# Patient Record
Sex: FEMALE
[De-identification: names, ages, dates, MRNs, and addresses within clinical notes are randomized per-mention and may not be internally consistent; named-entity substitution may affect disease eponyms.]

---

## 2017-10-25 ENCOUNTER — HOSPITAL ENCOUNTER (EMERGENCY)
Dept: HOSPITAL 14 - H.ER | Age: 27
Discharge: HOME | End: 2017-10-25
Payer: COMMERCIAL

## 2017-10-25 VITALS
SYSTOLIC BLOOD PRESSURE: 126 MMHG | TEMPERATURE: 98.7 F | OXYGEN SATURATION: 100 % | DIASTOLIC BLOOD PRESSURE: 64 MMHG | HEART RATE: 90 BPM | RESPIRATION RATE: 16 BRPM

## 2017-10-25 DIAGNOSIS — M53.3: ICD-10-CM

## 2017-10-25 DIAGNOSIS — Z88.0: ICD-10-CM

## 2017-10-25 DIAGNOSIS — M54.5: Primary | ICD-10-CM

## 2017-10-25 NOTE — ED PDOC
HPI: Back


Time Seen by Provider: 10/25/17 21:35


Chief Complaint (Nursing): Back Pain


Chief Complaint (Provider): Tailbone pain


History Per: Patient


History/Exam Limitations: no limitations


Onset/Duration Of Symptoms: Days (3)


Current Symptoms Are (Timing): Still Present


Additional Complaint(s): 





Patient is a 28 y/o female with no significant past medical history presenting 

to the emergency department for tailbone pain following a fall two days ago. 

Reports that slipped at work and landed on her buttocks. The pain then began 

yesterday and now radiates to her hips. Notes that she vomited once yesterday 

due to the pain. Reports minimal relief of pain from Aleve. Denies nausea, fever

, diarrhea, abdominal pain, blood in stool or urine, or other complaints. 





PCP: none provided.





Past Medical History


Reviewed: Historical Data


Vital Signs: 


 Last Vital Signs











Temp  98.7 F   10/25/17 21:14


 


Pulse  90   10/25/17 21:14


 


Resp  16   10/25/17 21:14


 


BP  126/64   10/25/17 21:14


 


Pulse Ox  100   10/25/17 21:14














- Medical History


PMH: No Chronic Diseases





- Family History


Family History: States: No Known Family Hx





- Home Medications


Home Medications: 


 Ambulatory Orders











 Medication  Instructions  Recorded


 


Cyclobenzaprine [Cyclobenzaprine 10 mg PO BID #14 tab 10/25/17





HCl]  


 


Ibuprofen [Motrin] 400 mg PO Q6 #30 tab 10/25/17














- Allergies


Allergies/Adverse Reactions: 


 Allergies











Allergy/AdvReac Type Severity Reaction Status Date / Time


 


Penicillins Allergy  RASH Verified 10/25/17 21:14














Review of Systems


ROS Statement: Except As Marked, All Systems Reviewed And Found Negative


Gastrointestinal: Positive for: Vomiting (1 episode).  Negative for: Nausea, 

Abdominal Pain, Diarrhea, Hematochezia


Genitourinary Female: Negative for: Hematuria


Musculoskeletal: Positive for: Other (Tailbone pain that radiates to hips)





Physical Exam





- Reviewed


Nursing Documentation Reviewed: Yes





- Physical Exam


Appears: Positive for: Well, Non-toxic, No Acute Distress


Head Exam: Positive for: ATRAUMATIC, NORMAL INSPECTION, NORMOCEPHALIC


Skin: Positive for: Normal Color, Warm, Dry


Eye Exam: Positive for: Normal appearance


Neck: Positive for: Normal


Cardiovascular/Chest: Positive for: Regular Rate, Rhythm


Respiratory: Negative for: Accessory Muscle Use, Respiratory Distress


Gastrointestinal/Abdominal: Positive for: Normal Exam, Bowel Sounds, Soft.  

Negative for: Tenderness


Back: Positive for: Vertebral Tenderness (from L5 to S1, mild)


Extremity: Positive for: Normal ROM (hips).  Negative for: Tenderness (ischial 

tuberosity or interochanteric pain)


Neurologic/Psych: Positive for: Alert, Oriented (x3)





- ECG


O2 Sat by Pulse Oximetry: 100 (RA)


Pulse Ox Interpretation: Normal





Medical Decision Making


Medical Decision Making: 





Time: 21:39





Initial impression: Tailbone pain





Initial plan:


 ED Urine Pregnancy


 X-ray sacrum and coccyx-negative. f/u wtih pmd flexril and motrin for pain





--------------------------------------------------------------------------------

-----------------~


Scribe Attestation:


Documented by Radha Salgado, acting as a scribe for MAGGIE Dent.





Provider Scribe Attestation:


All medical record entries made by the Scribe were at my direction and 

personally dictated by me. I have reviewed the chart and agree that the record 

accurately reflects my personal performance of the history, physical exam, 

medical decision making, and the department course for this patient. I have 

also personally directed, reviewed, and agree with the discharge instructions 

and disposition.





Disposition





- Clinical Impression


Clinical Impression: 


 Sacral pain








- Patient ED Disposition


Is Patient to be Admitted: No


Counseled Patient/Family Regarding: Need For Followup





- Disposition


Disposition: Routine/Home


Disposition Time: 22:55


Condition: STABLE


Prescriptions: 


Cyclobenzaprine [Cyclobenzaprine HCl] 10 mg PO BID #14 tab


Ibuprofen [Motrin] 400 mg PO Q6 #30 tab


Instructions:  Back Pain (ED)


Forms:  [facility] (English)

## 2017-10-26 NOTE — RAD
PROCEDURE:  Radiographs of the Sacrum and Coccyx



HISTORY:

direct injury



COMPARISON:

None available.



TECHNIQUE:

Frontal and lateral views of the sacrum and coccyx



FINDINGS:



BONES:

Sacrum and coccyx unremarkable. No fracture or focal lesion.



SACROILIAC JOINTS:

Unremarkable.



OTHER FINDINGS:

None.



IMPRESSION:

Unremarkable radiographs of the sacrum and coccyx.

## 2018-07-22 ENCOUNTER — HOSPITAL ENCOUNTER (EMERGENCY)
Dept: HOSPITAL 14 - H.ER | Age: 28
Discharge: HOME | End: 2018-07-22
Payer: COMMERCIAL

## 2018-07-22 VITALS — OXYGEN SATURATION: 99 %

## 2018-07-22 VITALS
HEART RATE: 81 BPM | SYSTOLIC BLOOD PRESSURE: 125 MMHG | DIASTOLIC BLOOD PRESSURE: 75 MMHG | RESPIRATION RATE: 15 BRPM | TEMPERATURE: 98.1 F

## 2018-07-22 DIAGNOSIS — Z88.0: ICD-10-CM

## 2018-07-22 DIAGNOSIS — N30.90: Primary | ICD-10-CM

## 2018-07-22 LAB
ALBUMIN SERPL-MCNC: 3.9 G/DL (ref 3.5–5)
ALBUMIN/GLOB SERPL: 1.3 {RATIO} (ref 1–2.1)
ALT SERPL-CCNC: 27 U/L (ref 9–52)
AST SERPL-CCNC: 22 U/L (ref 14–36)
BACTERIA #/AREA URNS HPF: (no result) /[HPF]
BASOPHILS # BLD AUTO: 0.1 K/UL (ref 0–0.2)
BASOPHILS NFR BLD: 1 % (ref 0–2)
BILIRUB UR-MCNC: NEGATIVE MG/DL
BUN SERPL-MCNC: 15 MG/DL (ref 7–17)
CALCIUM SERPL-MCNC: 8.7 MG/DL (ref 8.4–10.2)
COLOR UR: YELLOW
EOSINOPHIL # BLD AUTO: 0.5 K/UL (ref 0–0.7)
EOSINOPHIL NFR BLD: 7.2 % (ref 0–4)
ERYTHROCYTE [DISTWIDTH] IN BLOOD BY AUTOMATED COUNT: 14.3 % (ref 11.5–14.5)
GFR NON-AFRICAN AMERICAN: > 60
GLUCOSE UR STRIP-MCNC: (no result) MG/DL
HGB BLD-MCNC: 12 G/DL (ref 12–16)
LEUKOCYTE ESTERASE UR-ACNC: (no result) LEU/UL
LYMPHOCYTES # BLD AUTO: 2.7 K/UL (ref 1–4.3)
LYMPHOCYTES NFR BLD AUTO: 41.4 % (ref 20–40)
MCH RBC QN AUTO: 26.9 PG (ref 27–31)
MCHC RBC AUTO-ENTMCNC: 33.3 G/DL (ref 33–37)
MCV RBC AUTO: 80.9 FL (ref 81–99)
MONOCYTES # BLD: 0.5 K/UL (ref 0–0.8)
MONOCYTES NFR BLD: 7.6 % (ref 0–10)
NEUTROPHILS # BLD: 2.8 K/UL (ref 1.8–7)
NEUTROPHILS NFR BLD AUTO: 42.8 % (ref 50–75)
NRBC BLD AUTO-RTO: 0.1 % (ref 0–0)
PH UR STRIP: 6 [PH] (ref 5–8)
PLATELET # BLD: 237 K/UL (ref 130–400)
PMV BLD AUTO: 8.2 FL (ref 7.2–11.7)
PROT UR STRIP-MCNC: NEGATIVE MG/DL
RBC # BLD AUTO: 4.46 MIL/UL (ref 3.8–5.2)
RBC # UR STRIP: NEGATIVE /UL
SP GR UR STRIP: 1.01 (ref 1–1.03)
SQUAMOUS EPITHIAL: 3 /HPF (ref 0–5)
URINE CLARITY: (no result)
UROBILINOGEN UR-MCNC: (no result) MG/DL (ref 0.2–1)
WBC # BLD AUTO: 6.5 K/UL (ref 4.8–10.8)

## 2018-07-22 PROCEDURE — 99284 EMERGENCY DEPT VISIT MOD MDM: CPT

## 2018-07-22 PROCEDURE — 81003 URINALYSIS AUTO W/O SCOPE: CPT

## 2018-07-22 PROCEDURE — 72070 X-RAY EXAM THORAC SPINE 2VWS: CPT

## 2018-07-22 PROCEDURE — 87040 BLOOD CULTURE FOR BACTERIA: CPT

## 2018-07-22 PROCEDURE — 81025 URINE PREGNANCY TEST: CPT

## 2018-07-22 PROCEDURE — 96372 THER/PROPH/DIAG INJ SC/IM: CPT

## 2018-07-22 PROCEDURE — 74176 CT ABD & PELVIS W/O CONTRAST: CPT

## 2018-07-22 PROCEDURE — 72100 X-RAY EXAM L-S SPINE 2/3 VWS: CPT

## 2018-07-22 PROCEDURE — 80053 COMPREHEN METABOLIC PANEL: CPT

## 2018-07-22 PROCEDURE — 85025 COMPLETE CBC W/AUTO DIFF WBC: CPT

## 2018-07-22 PROCEDURE — 87086 URINE CULTURE/COLONY COUNT: CPT

## 2018-07-22 NOTE — ED PDOC
HPI: Back


Time Seen by Provider: 18 17:53


Chief Complaint (Nursing): Back Pain


Chief Complaint (Provider): Back Pain


History Per: Patient


History/Exam Limitations: no limitations


Onset/Duration Of Symptoms: Days (x4-5)


Current Symptoms Are (Timing): Still Present


Additional Complaint(s): 


27 y/o female with no significant PMHx presenting for evaluation of back pain x4

-5 days. Patient states for the past 4-5 days shes had atraumatic lower and 

mid back pain. She reports she does a lot of heavy lifting at work, but denies 

any injury. She states the pain is non-radiating and has no alleviating or 

exacerbating factors. She states she had an MRI 5-6 years ago which showed she 

had a herniated disc in her lower back. She denies any hematuria, dysuria, 

incontinence, abdominal pain, nausea, vomiting, fever, or chest pain. 





PMD: Non-[facility] Provider








Past Medical History


Reviewed: Historical Data, Nursing Documentation, Vital Signs


Vital Signs: 


 Last Vital Signs











Temp  98.1 F   18 21:04


 


Pulse  81   18 21:04


 


Resp  15   18 21:04


 


BP  125/75   18 21:04


 


Pulse Ox  99   18 21:04














- Medical History


PMH: No Chronic Diseases





- Surgical History


Surgical History: No Surg Hx





- Family History


Family History: States: Unknown Family Hx





- Home Medications


Home Medications: 


 Ambulatory Orders











 Medication  Instructions  Recorded


 


Cyclobenzaprine [Cyclobenzaprine 10 mg PO BID #14 tab 10/25/17





HCl]  


 


Ibuprofen [Motrin] 400 mg PO Q6 #30 tab 10/25/17


 


Ciprofloxacin [Cipro] 500 mg PO BID #14 tab 18


 


Naproxen [Naprosyn] 500 mg PO BID PRN #30 tab 18


 


Phenazopyridine [Pyridium] 100 mg PO BID #6 tab 18














- Allergies


Allergies/Adverse Reactions: 


 Allergies











Allergy/AdvReac Type Severity Reaction Status Date / Time


 


Penicillins Allergy  RASH Verified 10/25/17 21:14














Review of Systems


ROS Statement: Except As Marked, All Systems Reviewed And Found Negative


Constitutional: Negative for: Fever


Cardiovascular: Negative for: Chest Pain


Gastrointestinal: Negative for: Nausea, Vomiting, Abdominal Pain


Genitourinary Female: Negative for: Dysuria, Incontinence, Hematuria


Musculoskeletal: Positive for: Back Pain





Physical Exam





- Reviewed


Nursing Documentation Reviewed: Yes


Vital Signs Reviewed: Yes





- Physical Exam


Appears: Positive for: Non-toxic, No Acute Distress


Cardiovascular/Chest: Positive for: Regular Rate, Rhythm.  Negative for: Murmur


Respiratory: Positive for: Normal Breath Sounds.  Negative for: Respiratory 

Distress


Gastrointestinal/Abdominal: Positive for: Normal Exam, Soft.  Negative for: 

Tenderness


Back: Positive for: Normal Inspection.  Negative for: L CVA Tenderness, R CVA 

Tenderness, Vertebral Tenderness


Neurologic/Psych: Positive for: Alert, Oriented (x3)





- Laboratory Results


Result Diagrams: 


 18 20:07





 18 20:07





- ECG


O2 Sat by Pulse Oximetry: 99 (RA)


Pulse Ox Interpretation: Normal





Medical Decision Making


Medical Decision Makin:04


Plan:


-Urine pregnancy


-Flexeril 10mg PO


-Lidocaine 5%


-Toradol 30mg IM


-X-Ray dorsal spine


-X-Ray lumbar spine


-Urinalysis


-Reevaluation





UA shows positive nitrates due to symptoms of back pain. CT abdomen and IV 

Cipro ordered.


CT abd/pelvis w/o contrast: 1. Circumferential bladder wall thickening is 

suggestive of cystitis.


2. Moderate amount of gas and stool in the colon.


Pt. informed of results. Pt. in no distress. No CVA tenderness b/l. 








--------------------------------------------------------------------------------

-----------------


Scribe Attestation:


Documented by Jay Dawson, acting as a scribe for Abraham Graves PA-C.


   


Provider Scribe Attestation:


All medical record entries made by the scribe were at my direction and 

personally dictated by me. I have reviewed the chart and agree that the record 

accurately reflects my personal performance of the history, physical exam, 

medical decision making, and the department course for this patient. I have 

also personally directed, reviewed, and agree with the discharge instructions 

and disposition.











Disposition





- Clinical Impression


Clinical Impression: 


 UTI (urinary tract infection)








- Patient ED Disposition


Is Patient to be Admitted: No


Counseled Patient/Family Regarding: Studies Performed, Diagnosis, Need For 

Followup, Rx Given





- Disposition


Referrals: 


[facility] [Outside]


[facility] [Outside]


Disposition: Routine/Home


Disposition Time: 21:04


Condition: IMPROVED


Additional Instructions: 





NIKI APODACA, thank you for letting us take care of you today. Your 

provider was Herminia Akhtar MD and you were treated for BACK PAIN. The 

emergency medical care you received today was directed at your acute symptoms. 

If you were prescribed any medication, please fill it and take as directed. It 

may take several days for your symptoms to resolve. Return to the Emergency 

Department if your symptoms worsen, do not improve, or if you have any other 

problems.





Please contact your doctor or call one of the physicians/clinics you have been 

referred to that are listed on the Patient Visit Information form that is 

included in your discharge packet. Bring any paperwork you were given at 

discharge with you along with any medications you are taking to your follow up 

visit. Our treatment cannot replace ongoing medical care by a primary care 

provider outside of the emergency department.





Thank you for allowing the [facility] [employer] team to be part of your care today.








If you had an X-Ray or CT scan: A Radiologist will review the ED reading if any 

change in treatment is needed we will contact you.***





If you had a blood, urine, or wound culture: It will take several days for the 

results, if any change in treatment is needed we will contact you.***





If you had an STI test: It will take 48 hours for the results. Please call 

after 1 week if you have not heard back.***


Prescriptions: 


Ciprofloxacin [Cipro] 500 mg PO BID #14 tab


Naproxen [Naprosyn] 500 mg PO BID PRN #30 tab


 PRN Reason: Pain


Phenazopyridine [Pyridium] 100 mg PO BID #6 tab


Instructions:  Urinary Tract Infection, Adult (DC)


Forms:  [facility] (English), [facility] ED School/Work Excuse


Print Language: ENGLISH

## 2018-07-23 NOTE — RAD
Date of service: 



07/22/2018



HISTORY:

pain







COMPARISON:

No prior.



FINDINGS:



BONES:

Alignment maintained. No fracture.



DISC SPACES:

Normal.



SOFT TISSUES:

Normal.



OTHER FINDINGS:

None.



IMPRESSION:

Unremarkable radiographs of the thoracic spine.

## 2018-07-23 NOTE — CT
Date of service: 



07/22/2018



PROCEDURE:  CT Abdomen and Pelvis without intravenous contrast



HISTORY:

UTI b/l upper back pain



COMPARISON:

None.



TECHNIQUE:

Helical CT of the abdomen and pelvis was performed without oral or 

intravenous contrast as per referring physician request. 



Contrast dose: None



Radiation dose:



Total exam DLP = 211.10 mGy-cm.



This CT exam was performed using one or more of the following dose 

reduction techniques: Automated exposure control, adjustment of the 

mA and/or kV according to patient size, and/or use of iterative 

reconstruction technique.



FINDINGS:



LOWER THORAX:

A tiny subpleural calcified granuloma left lower lobe with lung bases 

otherwise unremarkable bilaterally. 



LIVER:

Unremarkable. No gross lesion or ductal dilatation.  



GALLBLADDER AND BILE DUCTS:

Unremarkable. 



PANCREAS:

Unremarkable. No gross lesion or ductal dilatation.



SPLEEN:

Unremarkable. 



ADRENALS:

Unremarkable. No mass. 



KIDNEYS AND URETERS:

There is no radiodense urolithiasis, obstructive uropathy or 

perinephric reaction bilaterally. Kidneys appear homogeneous in 

overall density throughout. Urinary bladder appears unremarkable as 

well. 



VASCULATURE:

Unremarkable. No aortic aneurysm. 



BOWEL:

The stomach is distended with retained food. No obstruction. No gross 

mural thickening. 



APPENDIX:

Unremarkable. Normal appendix. 



PERITONEUM:

Unremarkable. No free fluid. No free air. 



LYMPH NODES:

Unremarkable. No enlarged lymph nodes. 



BLADDER:

The urinary bladder is not fully distended and evaluation of the 

urinary bladder wall is incomplete.  Thickening is not excluded. 



REPRODUCTIVE:

Tampon identified within the endometrial cavity. 



BONES:

No acute fracture. 



OTHER FINDINGS:

None.



IMPRESSION:

No obstructive uropathy, radiodense urolithiasis or perinephric 

reaction.  Urinary bladder is not fully distended and is incomplete 

in evaluation.  Cystitis is difficult to completely exclude though 

this is not favored.



Further evaluation of the remaining abdominal pelvic versus limited 

due lack of oral and intravenous contrast administration. No definite 

acute findings appreciable grossly.



Concordant preliminary report from [facility], 07/22/2018.

## 2018-07-23 NOTE — RAD
Date of service: 



07/22/2018



PROCEDURE:  Radiographs of the Lumbar Spine.



HISTORY:

pain







COMPARISON:

No prior.



FINDINGS:



BONES:

Normal alignment. No listhesis. No fracture.



DISC SPACES:

Unremarkable.



OTHER FINDINGS:

None.



IMPRESSION:

Unremarkable radiographs of the lumbar spine.

## 2018-07-31 ENCOUNTER — HOSPITAL ENCOUNTER (OUTPATIENT)
Dept: HOSPITAL 14 - H.ER | Age: 28
Setting detail: OBSERVATION
LOS: 1 days | Discharge: HOME | End: 2018-08-01
Attending: INTERNAL MEDICINE | Admitting: INTERNAL MEDICINE
Payer: MEDICAID

## 2018-07-31 VITALS — RESPIRATION RATE: 20 BRPM

## 2018-07-31 VITALS — BODY MASS INDEX: 21.4 KG/M2

## 2018-07-31 DIAGNOSIS — N39.0: Primary | ICD-10-CM

## 2018-07-31 DIAGNOSIS — Z88.0: ICD-10-CM

## 2018-07-31 LAB
ALBUMIN SERPL-MCNC: 4.4 G/DL (ref 3.5–5)
ALBUMIN/GLOB SERPL: 1.2 {RATIO} (ref 1–2.1)
ALT SERPL-CCNC: 25 U/L (ref 9–52)
AST SERPL-CCNC: 33 U/L (ref 14–36)
BACTERIA #/AREA URNS HPF: (no result) /[HPF]
BASOPHILS # BLD AUTO: 0 K/UL (ref 0–0.2)
BASOPHILS NFR BLD: 0.3 % (ref 0–2)
BILIRUB UR-MCNC: NEGATIVE MG/DL
BUN SERPL-MCNC: 15 MG/DL (ref 7–17)
CALCIUM SERPL-MCNC: 8.9 MG/DL (ref 8.4–10.2)
COLOR UR: YELLOW
EOSINOPHIL # BLD AUTO: 0.1 K/UL (ref 0–0.7)
EOSINOPHIL NFR BLD: 1.1 % (ref 0–4)
ERYTHROCYTE [DISTWIDTH] IN BLOOD BY AUTOMATED COUNT: 14.1 % (ref 11.5–14.5)
GFR NON-AFRICAN AMERICAN: > 60
GLUCOSE UR STRIP-MCNC: (no result) MG/DL
HGB BLD-MCNC: 13 G/DL (ref 12–16)
LEUKOCYTE ESTERASE UR-ACNC: (no result) LEU/UL
LYMPHOCYTES # BLD AUTO: 1.3 K/UL (ref 1–4.3)
LYMPHOCYTES NFR BLD AUTO: 16.3 % (ref 20–40)
MCH RBC QN AUTO: 27.1 PG (ref 27–31)
MCHC RBC AUTO-ENTMCNC: 33.2 G/DL (ref 33–37)
MCV RBC AUTO: 81.7 FL (ref 81–99)
MONOCYTES # BLD: 0.3 K/UL (ref 0–0.8)
MONOCYTES NFR BLD: 3.9 % (ref 0–10)
NEUTROPHILS # BLD: 6.2 K/UL (ref 1.8–7)
NEUTROPHILS NFR BLD AUTO: 78.4 % (ref 50–75)
NRBC BLD AUTO-RTO: 0.1 % (ref 0–0)
PH UR STRIP: 7 [PH] (ref 5–8)
PLATELET # BLD: 238 K/UL (ref 130–400)
PMV BLD AUTO: 8.7 FL (ref 7.2–11.7)
PROT UR STRIP-MCNC: NEGATIVE MG/DL
RBC # BLD AUTO: 4.78 MIL/UL (ref 3.8–5.2)
RBC # UR STRIP: NEGATIVE /UL
SP GR UR STRIP: 1.02 (ref 1–1.03)
SQUAMOUS EPITHIAL: 1 /HPF (ref 0–5)
URINE AMORPHOUS SEDIMENT: (no result) /UL
URINE CLARITY: (no result)
UROBILINOGEN UR-MCNC: (no result) MG/DL (ref 0.2–1)
WBC # BLD AUTO: 7.9 K/UL (ref 4.8–10.8)

## 2018-07-31 PROCEDURE — 87040 BLOOD CULTURE FOR BACTERIA: CPT

## 2018-07-31 PROCEDURE — 81003 URINALYSIS AUTO W/O SCOPE: CPT

## 2018-07-31 PROCEDURE — 76830 TRANSVAGINAL US NON-OB: CPT

## 2018-07-31 PROCEDURE — 99285 EMERGENCY DEPT VISIT HI MDM: CPT

## 2018-07-31 PROCEDURE — 80053 COMPREHEN METABOLIC PANEL: CPT

## 2018-07-31 PROCEDURE — 81025 URINE PREGNANCY TEST: CPT

## 2018-07-31 PROCEDURE — 85025 COMPLETE CBC W/AUTO DIFF WBC: CPT

## 2018-07-31 PROCEDURE — 87086 URINE CULTURE/COLONY COUNT: CPT

## 2018-07-31 NOTE — US
Date of service: 



07/31/2018



HISTORY:

Pelvic pain 



COMPARISON:

None available.



TECHNIQUE:

Transvaginal pelvic ultrasound was performed.



FINDINGS:



UTERUS:

Measures 6.4 x 3.2 x 4.3 cm. Anteverted, normal in size and 

appearance. No fibroid or other mass lesion seen.



ENDOMETRIUM:

Measures 3.0 mm in diameter. The central endometrial echo complex is 

normal in appearance. 



CERVIX:

No cervical abnormality identified.



RIGHT OVARY:

Measures 2.9 x 1.5 x 2.6 cm. No solid mass. Normal flow. 



LEFT OVARY:

Measures 3.0 x 1.8 x 2.3 cm. No solid mass. Normal flow. There is a 

1.5 x 0.8 x 1.3 cm dominant follicle/ cyst.



FREE FLUID:

There is small amount of free fluid in the pelvis, likely physiologic.



OTHER FINDINGS:

None. 



IMPRESSION:

Unremarkable pelvic ultrasound.

## 2018-07-31 NOTE — ED PDOC
HPI: Female  Pain


Time Seen by Provider: 07/31/18 09:13


Chief Complaint (Nursing): Female Genitourinary


Chief Complaint (Provider): pelvic pain


History Per: Patient


History/Exam Limitations: no limitations


Onset/Duration Of Symptoms: Days


Current Symptoms Are (Timing): Still Present


Additional Complaint(s): 





Pt. came to the ER 1.5 weeks approx for left back pain and right lower pelvic 

pain.  Had Ct and urine eval.  Dx with uti and given cipro.  She was called 

several days later and antibiotics changed to macrobid due to sensitivities.  

Pt. here as she still has left back pain and right pelvic pain.  No dysuria, 

hematuria.  No freq of urination.  No chest pain, dyspnea, weakness, dizziness, 

fever.  





Past Medical History


Reviewed: Nursing Documentation, Vital Signs


Vital Signs: 





 Last Vital Signs











Temp  97.8 F   07/31/18 08:47


 


Pulse  69   07/31/18 08:47


 


Resp  16   07/31/18 08:47


 


BP  121/72   07/31/18 08:47


 


Pulse Ox  100   07/31/18 08:47














- Medical History


PMH: No Chronic Diseases





- Surgical History


Surgical History: No Surg Hx





- Family History


Family History: States: Unknown Family Hx





- Home Medications


Home Medications: 


 Ambulatory Orders











 Medication  Instructions  Recorded


 


Cyclobenzaprine [Cyclobenzaprine 10 mg PO BID #14 tab 10/25/17





HCl]  


 


Ibuprofen [Motrin] 400 mg PO Q6 #30 tab 10/25/17


 


Ciprofloxacin [Cipro] 500 mg PO BID #14 tab 07/22/18


 


Naproxen [Naprosyn] 500 mg PO BID PRN #30 tab 07/22/18


 


Phenazopyridine [Pyridium] 100 mg PO BID #6 tab 07/22/18


 


Nitrofurantoin Macrocrystals 100 mg PO BID #14 cap 07/25/18





[Macrobid]  














- Allergies


Allergies/Adverse Reactions: 


 Allergies











Allergy/AdvReac Type Severity Reaction Status Date / Time


 


Penicillins Allergy  RASH Verified 10/25/17 21:14














Review of Systems


ROS Statement: Except As Marked, All Systems Reviewed And Found Negative


Gastrointestinal: Positive for: Abdominal Pain


Genitourinary Female: Positive for: Pelvic Pain


Musculoskeletal: Positive for: Back Pain





Physical Exam





- Reviewed


Nursing Documentation Reviewed: Yes


Vital Signs Reviewed: Yes





- Physical Exam


Appears: Positive for: Non-toxic, No Acute Distress


Head Exam: Positive for: ATRAUMATIC, NORMAL INSPECTION, NORMOCEPHALIC


Skin: Positive for: Normal Color, Warm, DRY


Eye Exam: Positive for: EOMI, Normal appearance, PERRL


ENT: Positive for: Normal ENT Inspection


Neck: Positive for: Normal, Painless ROM


Cardiovascular/Chest: Positive for: Regular Rate, Rhythm


Respiratory: Positive for: CNT, Normal Breath Sounds


Gastrointestinal/Abdominal: Positive for: Soft, Tenderness (R lower pelvic)


Back: Positive for: Normal Inspection, L CVA Tenderness (mild).  Negative for: 

R CVA Tenderness


Extremity: Positive for: Normal ROM.  Negative for: Tenderness, Pedal Edema


Neurologic/Psych: Positive for: Alert, Oriented





- Laboratory Results


Result Diagrams: 


 07/31/18 10:45





 07/31/18 10:45


Interpretation Of Abn Labs: no acute





- ECG


O2 Sat by Pulse Oximetry: 100





- CT Scan/US


  ** US


Other Rad Studies (CT/US): Read By Radiologist


Other Rad Interpretation: no acute





- Progress


ED Course And Treament: 





940:  CT from previous shows no appendix issues or kidney stones.  Pt. with 

continued issues.  Will check blood work and urine.  Pt. sensitive to macrobid 

and is on it. 





1304:  Still uncomfortable and vomiting.   Sensitive to cefepime.  Will give 

that and Dr. Garcia aware and will admit.





Disposition





- Clinical Impression


Clinical Impression: 


 UTI (urinary tract infection), Intractable abdominal pain, Vomiting








- Patient ED Disposition


Is Patient to be Admitted: Yes


Counseled Patient/Family Regarding: Studies Performed, Diagnosis





- Disposition


Disposition Time: 12:10


Condition: FAIR





- Pt Status Changed To:


Hospital Disposition Of: Observation





- POA


Present On Arrival: None

## 2018-08-01 VITALS — DIASTOLIC BLOOD PRESSURE: 68 MMHG | HEART RATE: 71 BPM | SYSTOLIC BLOOD PRESSURE: 114 MMHG | TEMPERATURE: 98.7 F

## 2018-08-01 VITALS — OXYGEN SATURATION: 100 %

## 2018-08-01 NOTE — HP
Copied To:  Armond Garcia MD

Attending MD:  Armond Garcia MD



SUBJECTIVE:  Ms. Zhanna Chaudhary is a 28-year-old female who was admitted via

the emergency room because of intractable abdominal pain, nausea for the

past 1 week, worse on the day of admission.  She had been seen in the

emergency room in the past, treated for urinary tract infection with Cipro.

She was called back several days later due to poor sensitivities from the

Cipro and was placed on Macrobid.  The patient had indicated that she had

developed left back pain and right pelvic pain with dysuria and hematuria

and was back in the hospital where she was admitted with intractable

abdominal pain.



PAST MEDICAL HISTORY:  She has an unremarkable past medical history.



FAMILY HISTORY:  Noncontributory.



SOCIAL HISTORY:  She does not drink or smoke.



REVIEW OF SYSTEMS:  Essentially unremarkable.



PHYSICAL EXAMINATION:

GENERAL:  The patient is alert, oriented, appears to be in some distress

because of abdominal pain.

VITAL SIGNS:  Remarkable for blood pressure of 121/72, pulse of 69,

respiratory rate of 60.  He is febrile.  O2 sat 100% on room air.

SKIN:  Shows fair turgor.  Pupils are equal and reactive to light and

accommodation.

MOUTH:  Shows fair hygiene.

NECK:  JVP flat.

LUNGS:  Clear.

HEART:  Regular.  No murmurs or gallop.

ABDOMEN:  Soft with some left upper quadrant tenderness and also suprapubic

tenderness.

GENITAL AND RECTAL:  Deferred.

CENTRAL NERVOUS SYSTEM:  Grossly intact.



LABORATORY DATA:  WBC 7.9, hemoglobin 13, platelet count 238,000.  Sodium

142, potassium 3.7, BUN 15, creatinine 0.7, serum glucose 98.  Transvaginal

ultrasound is essentially unremarkable.



IMPRESSION:  Intractable abdominal pain due to urinary tract infection

which has been nonresponsive to outpatient therapy.



PLAN:  IV hydration and analgesics for pain.  We would continue IV

antibiotic with Vibramycin.  If clinically stable, we will probably

discharge in a.m. and follow up as an outpatient.





__________________________________________

Armond Garcia MD



DD:  07/31/2018 18:19:08

DT:  07/31/2018 18:22:42

Job # [id number]

## 2018-08-01 NOTE — [email or web address]
Provider





- Provider


Date of Admission: 


07/31/18 13:11





Attending physician: 


Armond Garcia MD





Time Spent in preparation of Discharge (in minutes): 30





Diagnosis





- Discharge Diagnosis


(1) Intractable abdominal pain


Status: Acute   





(2) UTI (urinary tract infection)


Status: Acute   





Hospital Course





- Lab Results


Lab Results: 


 Most Recent Lab Values











WBC  7.9 K/uL (4.8-10.8)   07/31/18  10:45    


 


RBC  4.78 Mil/uL (3.80-5.20)   07/31/18  10:45    


 


Hgb  13.0 g/dL (12.0-16.0)   07/31/18  10:45    


 


Hct  39.0 % (34.0-47.0)   07/31/18  10:45    


 


MCV  81.7 fl (81.0-99.0)   07/31/18  10:45    


 


MCH  27.1 pg (27.0-31.0)   07/31/18  10:45    


 


MCHC  33.2 g/dL (33.0-37.0)   07/31/18  10:45    


 


RDW  14.1 % (11.5-14.5)   07/31/18  10:45    


 


Plt Count  238 K/uL (130-400)   07/31/18  10:45    


 


MPV  8.7 fl (7.2-11.7)   07/31/18  10:45    


 


Neut % (Auto)  78.4 % (50.0-75.0)  H  07/31/18  10:45    


 


Lymph % (Auto)  16.3 % (20.0-40.0)  L  07/31/18  10:45    


 


Mono % (Auto)  3.9 % (0.0-10.0)   07/31/18  10:45    


 


Eos % (Auto)  1.1 % (0.0-4.0)   07/31/18  10:45    


 


Baso % (Auto)  0.3 % (0.0-2.0)   07/31/18  10:45    


 


Neut # (Auto)  6.2 K/uL (1.8-7.0)   07/31/18  10:45    


 


Lymph # (Auto)  1.3 K/uL (1.0-4.3)   07/31/18  10:45    


 


Mono # (Auto)  0.3 K/uL (0.0-0.8)   07/31/18  10:45    


 


Eos # (Auto)  0.1 K/uL (0.0-0.7)   07/31/18  10:45    


 


Baso # (Auto)  0.0 K/uL (0.0-0.2)   07/31/18  10:45    


 


Sodium  142 mmol/l (132-148)   07/31/18  10:45    


 


Potassium  3.7 MMOL/L (3.6-5.0)   07/31/18  10:45    


 


Chloride  104 mmol/L ()   07/31/18  10:45    


 


Carbon Dioxide  25 mmol/L (22-30)   07/31/18  10:45    


 


Anion Gap  17  (10-20)   07/31/18  10:45    


 


BUN  15 mg/dl (7-17)   07/31/18  10:45    


 


Creatinine  0.7 mg/dl (0.7-1.2)   07/31/18  10:45    


 


Est GFR ( Amer)  > 60   07/31/18  10:45    


 


Est GFR (Non-Af Amer)  > 60   07/31/18  10:45    


 


Random Glucose  98 mg/dL ()   07/31/18  10:45    


 


Calcium  8.9 mg/dL (8.4-10.2)   07/31/18  10:45    


 


Total Bilirubin  0.4 mg/dl (0.2-1.3)   07/31/18  10:45    


 


AST  33 U/L (14-36)   07/31/18  10:45    


 


ALT  25 U/L (9-52)   07/31/18  10:45    


 


Alkaline Phosphatase  70 U/L ()   07/31/18  10:45    


 


Total Protein  8.0 G/DL (6.3-8.2)   07/31/18  10:45    


 


Albumin  4.4 g/dL (3.5-5.0)   07/31/18  10:45    


 


Globulin  3.6 gm/dL (2.2-3.9)   07/31/18  10:45    


 


Albumin/Globulin Ratio  1.2  (1.0-2.1)   07/31/18  10:45    


 


Urine Color  Yellow  (YELLOW)   07/31/18  15:50    


 


Urine Clarity  Cloudy  (Clear)   07/31/18  15:50    


 


Urine pH  7.0  (5.0-8.0)   07/31/18  15:50    


 


Ur Specific Gravity  1.020  (1.003-1.030)   07/31/18  15:50    


 


Urine Protein  Negative mg/dL (NEGATIVE)   07/31/18  15:50    


 


Urine Glucose (UA)  Neg mg/dL (Normal)   07/31/18  15:50    


 


Urine Ketones  20 mg/dL (NEGATIVE)   07/31/18  15:50    


 


Urine Blood  Negative  (NEGATIVE)   07/31/18  15:50    


 


Urine Nitrate  Negative  (NEGATIVE)   07/31/18  15:50    


 


Urine Bilirubin  Negative  (NEGATIVE)   07/31/18  15:50    


 


Urine Urobilinogen  0.2-1.0 mg/dL (0.2-1.0)   07/31/18  15:50    


 


Ur Leukocyte Esterase  Neg Win/uL (Negative)   07/31/18  15:50    


 


Urine RBC (Auto)  2 /hpf (0-3)   07/31/18  15:50    


 


Urine Microscopic WBC  3 /hpf (0-5)   07/31/18  15:50    


 


Ur Squamous Epith Cells  1 /hpf (0-5)   07/31/18  15:50    


 


Amorphous Sediment  Rare /ul (<OCC)  H  07/31/18  15:50    


 


Urine Bacteria  Rare  (<OCC)   07/31/18  15:50    














- Hospital Course


Hospital Course: 





ABDOMINAL PAIN RESOLVED





Discharge Exam





- Head Exam


Head Exam: ATRAUMATIC, NORMAL INSPECTION, NORMOCEPHALIC





- Eye Exam


Eye Exam: EOMI, Normal appearance, PERRL


Pupil Exam: NORMAL ACCOMODATION, PERRL





- GI/Abdominal Exam


GI & Abdominal Exam: Normal Bowel Sounds





- Rectal Exam


Rectal Exam: NORMAL INSPECTION





- Neurological Exam


Neurological exam: Alert, CN II-XII Intact, Normal Gait, Oriented x3, Reflexes 

Normal





- Psychiatric Exam


Psychiatric exam: Normal Affect, Normal Mood





- Skin


Skin Exam: Dry, Intact, Normal Color, Warm





Discharge Plan





- Follow Up Plan


Condition: IMPROVED


Disposition: HOME/ ROUTINE


Patient education suggested?: Yes


Instructions:  Urinary Tract Infections in Adults, How to Wash Your Hands 

Properly, Staying Safe in the Hospital


Additional Instructions: 


DISCHARGE TODAY


FOLLOW UP WITH OB/GYN OR UROLOGIST